# Patient Record
Sex: MALE | Race: WHITE | Employment: FULL TIME | URBAN - NONMETROPOLITAN AREA
[De-identification: names, ages, dates, MRNs, and addresses within clinical notes are randomized per-mention and may not be internally consistent; named-entity substitution may affect disease eponyms.]

---

## 2018-01-02 ENCOUNTER — HOSPITAL ENCOUNTER (OUTPATIENT)
Dept: MRI IMAGING | Age: 58
Discharge: HOME | End: 2018-01-02
Payer: COMMERCIAL

## 2018-01-02 DIAGNOSIS — M50.90 CERVICAL DISC DISEASE: ICD-10-CM

## 2018-01-02 PROCEDURE — 72141 MRI NECK SPINE W/O DYE: CPT

## 2020-08-18 ENCOUNTER — HOSPITAL ENCOUNTER (OUTPATIENT)
Dept: AUDIOLOGY | Age: 60
Discharge: HOME | End: 2020-08-18

## 2020-08-18 PROCEDURE — 92557 COMPREHENSIVE HEARING TEST: CPT | Performed by: AUDIOLOGIST

## 2020-08-18 NOTE — PROGRESS NOTES
AUDIOLOGICAL EVALUATION      REASON FOR TESTING:  Bilateral tinnitus and hearing loss. The patient denies any otalgia and aural pressure. He explains that the tinnitus has been present for approximately 7-8 years. He experiences episodic vertigo and treats himself with the Epley maneuver. He denies any history of occupational noise exposure. OTOSCOPY: WNL for both ears. AUDIOGRAM        Reliability: Good  Audiometer Used:  GSI-61    COMMENTS: Normal hearing sensitivity, sloping to severe sensorineural hearing loss for both ears. An asymmetry is present at 1500Hz-3KHz, with the left ear being worse. Speech discrimination ability is excellent at 92%, bilaterally. RECOMMENDATION(S):   1- ENT consult due to asymmetrical sensorineural hearing loss and tinnitus. Mr. Miguel Causey was encouraged to discuss this referral with his primary care provider. 2- Further testing, such as imaging of the internal auditory canals, is recommended to rule out a retrocochlear lesion in the asymmetrical sensorineural hearing loss. 3- Upon medical clearance, binaural amplification is recommended, especially for possible tinnitus masking benefits. He can be scheduled for a hearing aid evaluation appointment with [facility] following medical clearance. 4- Annual audiometry for monitoring purposes or sooner if any changes are noted in hearing ability.

## 2020-08-21 ENCOUNTER — TELEPHONE (OUTPATIENT)
Dept: ENT CLINIC | Age: 60
End: 2020-08-21

## 2020-08-21 ENCOUNTER — TELEPHONE (OUTPATIENT)
Dept: AUDIOLOGY | Age: 60
End: 2020-08-21

## 2020-08-21 NOTE — TELEPHONE ENCOUNTER
Called patient's insurance [employer] to determine if he has coverage for hearing aids. Hearing aids are excluded from his plan. He will need to self pay for amplification. Patient has appt with Avel Begun in ENT office 9/1/20 to review audiogram/asymmetrical SNHL.

## 2020-08-21 NOTE — TELEPHONE ENCOUNTER
Patient was in the office wanting to be seen by [facility]; does this patient need to be seen. Please advise.

## 2020-09-01 ENCOUNTER — OFFICE VISIT (OUTPATIENT)
Dept: ENT CLINIC | Age: 60
End: 2020-09-01
Payer: COMMERCIAL

## 2020-09-01 VITALS
SYSTOLIC BLOOD PRESSURE: 120 MMHG | DIASTOLIC BLOOD PRESSURE: 78 MMHG | HEIGHT: 71 IN | TEMPERATURE: 98.2 F | RESPIRATION RATE: 12 BRPM | BODY MASS INDEX: 28.22 KG/M2 | WEIGHT: 201.6 LBS | HEART RATE: 74 BPM

## 2020-09-01 PROCEDURE — 99203 OFFICE O/P NEW LOW 30 MIN: CPT | Performed by: PHYSICIAN ASSISTANT

## 2020-09-01 ASSESSMENT — ENCOUNTER SYMPTOMS
SHORTNESS OF BREATH: 0
APNEA: 0
FACIAL SWELLING: 0
VOICE CHANGE: 0
COLOR CHANGE: 0
SINUS PRESSURE: 0
CHEST TIGHTNESS: 0
RHINORRHEA: 0
ABDOMINAL PAIN: 0
WHEEZING: 0
STRIDOR: 0
DIARRHEA: 0
NAUSEA: 0
VOMITING: 0
SORE THROAT: 0
CHOKING: 0
TROUBLE SWALLOWING: 0
COUGH: 0

## 2020-09-01 NOTE — PROGRESS NOTES
[address] [facility]SE [facility]  Juan [address] [place] [address]  Dept: [phone]  Dept Fax: [phone]  Loc: [phone]    Barney Tomas is a 62 y.o. male who was referred by No ref. provider found for:  Chief Complaint   Patient presents with    New Patient     New patient is here for abnormal audio. Renata Marie HPI:     The patient presents for audiogram results. The patient reports that he has had bilateral tinnitus for at least 7 to 8 years. He reports recently started noticed some issues with hearing sound that certain pitches and discussed with his PCP. His PCP schedule him for an audiogram.  The patient reports that he feels like his hearing is equal bilaterally. Reports that tinnitus is constant. He states that he had an audiogram when the tinnitus started years ago and was told that there was nothing they could do for him. The patient was previously diagnosed with BPPV and has had the Epley maneuver approximately 3 times in his life. He reports that he can sense a flareup of his vertigo happening and with stretching his neck the sensation goes away. He reports his last episode of true vertigo was approximately 2 years ago and was reportedly evaluated because he was concerned he was having a stroke. He reports that his hearing and tinnitus has remained stable during previous dizzy spells. He denies a history of recurrent ear infections and tubes. He also denies a history of loud noise exposure. He denies any other concerns at this time. Subjective:        Review of Systems   Constitutional: Negative for activity change, appetite change, chills, diaphoresis, fatigue, fever and unexpected weight change. HENT: Positive for hearing loss and tinnitus.  Negative for congestion, dental problem, ear discharge, ear pain, facial swelling, mouth sores, nosebleeds, postnasal drip, rhinorrhea, sinus pressure, sneezing, sore throat, trouble swallowing and voice change. Eyes: Negative for visual disturbance. Respiratory: Negative for apnea, cough, choking, chest tightness, shortness of breath, wheezing and stridor. Cardiovascular: Negative for chest pain, palpitations and leg swelling. Gastrointestinal: Negative for abdominal pain, diarrhea, nausea and vomiting. Endocrine: Negative for cold intolerance, heat intolerance, polydipsia and polyuria. Genitourinary: Negative for difficulty urinating, discharge, dysuria, enuresis, hematuria, penile pain, penile swelling, scrotal swelling, testicular pain and urgency. Musculoskeletal: Negative for arthralgias, gait problem, neck pain and neck stiffness. Skin: Negative for color change, rash and wound. Allergic/Immunologic: Negative for environmental allergies, food allergies and immunocompromised state. Neurological: Negative for dizziness, seizures, syncope, facial asymmetry, speech difficulty, light-headedness and headaches. Hematological: Negative for adenopathy. Does not bruise/bleed easily. Psychiatric/Behavioral: Negative for confusion, sleep disturbance and suicidal ideas. The patient is not nervous/anxious. Past Medical History:  History reviewed. No pertinent past medical history. Social History:    TOBACCO:   reports that he has never smoked. He has never used smokeless tobacco.  ETOH:   reports current alcohol use. DRUGS:   has no history on file for drug. Family History:       Problem Relation Age of Onset    Heart Disease Mother     High Blood Pressure Father        Surgical History:  Past Surgical History:   Procedure Laterality Date    ANTERIOR CRUCIATE LIGAMENT REPAIR          Objective: This is a 62 y.o. male. Patient is alert and oriented to person, place and time. Patient appears well developed, well nourished. Mood is happy with normal affect. Not obviously hearing impaired. No abnormality in speech noted.     /78 (Site: Left Upper Arm, Position: Sitting)   Pulse 74   Temp 98.2 °F (36.8 °C) (Infrared)   Resp 12   Ht 5' 11\" (1.803 m)   Wt 201 lb 9.6 oz (91.4 kg)   BMI 28.12 kg/m²     Head:   Normocephalic, atraumatic. No obvious masses or lesions noted. Ears:  External ears: Normal: no scars, lesions or masses. Mastoid process: No erythema noted. No tenderness to palpation. R External auditory canal: clear and free of any pathology  L External auditory canal: clear and free of any pathology   Tympanic membranes:  R intact, translucent                                                  L intact, translucent  Nose:    External nose: Appears midline. No obvious deformity or masses. Septum:  deviated. No septal hematoma. No perforation. Mucosa:  clear  Turbinates: pink and congested            Discharge:  none    Mouth/Throat:  Lips, tongue and oral cavity: Normal. No masses or lesions noted   Dentition: good, no malocclusion  Oral mucosa: moist  Tonsils: present, cryptic, but not acutely enlarged and no erythema or exudates  Oropharynx: normal-appearing mucosa  Hard and soft palates: symmetrical and intact. Salivary glands: not enlarged and no tenderness to palpation. Uvula: midline, no obvious lesions   Gag reflex is present. Neck: Trachea midline. Thyroid not enlarged, no palpable masses or tenderness. Lymphatic: No cervical lymphadenopathy noted. Eyes: RADHA, EOM intact. Conjunctiva moist without discharge. Lungs: Normal effort of breathing, not obviously distressed. Neuro: Cranial nerves II-XII grossly intact. Extremities: No clubbing, edema, or cyanosis noted. Data:    Other diagnostic test:  AUDIOGRAM    Reliability: Good    Assessment/Plan:     Diagnosis Orders   1. Asymmetrical hearing loss of both ears  MRI BRAIN W WO CONTRAST   2. Sensorineural hearing loss (SNHL) of both ears  MRI BRAIN W WO CONTRAST   3. Tinnitus of both ears     4.  Dizziness         The patient is a 62 y.o. male that presents for evaluation of

## 2020-09-10 ENCOUNTER — HOSPITAL ENCOUNTER (OUTPATIENT)
Dept: MRI IMAGING | Age: 60
Discharge: HOME | End: 2020-09-10
Payer: COMMERCIAL

## 2020-09-10 PROCEDURE — 6360000004 HC RX CONTRAST MEDICATION: Performed by: PHYSICIAN ASSISTANT

## 2020-09-10 PROCEDURE — 70553 MRI BRAIN STEM W/O & W/DYE: CPT

## 2020-09-10 PROCEDURE — A9579 GAD-BASE MR CONTRAST NOS,1ML: HCPCS | Performed by: PHYSICIAN ASSISTANT

## 2020-09-10 RX ADMIN — GADOTERIDOL 15 ML: 279.3 INJECTION, SOLUTION INTRAVENOUS at 18:53

## 2020-09-14 ENCOUNTER — TELEPHONE (OUTPATIENT)
Dept: ENT CLINIC | Age: 60
End: 2020-09-14

## 2020-09-14 NOTE — TELEPHONE ENCOUNTER
I attempted to call the patient to discuss his MRI results. There was no answer, so I left a voicemail requesting he call the office back. He called back shortly after I left the voicemail. I informed him that the MRI showed no source for his asymmetrical hearing loss. He would like to proceed with hearing aid evaluation. Order for evaluation placed. He denies any other questions or concerns at this time. He will contact the office if anything changes.

## 2020-10-14 ENCOUNTER — HOSPITAL ENCOUNTER (OUTPATIENT)
Dept: AUDIOLOGY | Age: 60
Discharge: HOME | End: 2020-10-14

## 2020-10-14 PROCEDURE — 9990000010 HC NO CHARGE VISIT: Performed by: AUDIOLOGIST

## 2020-10-14 NOTE — PROGRESS NOTES
HEARING AID EVALUATION: Discussed hearing aid options with the patient. Reviewed styles and technology. Patient would like to try CICs. Decided on Tapan Philip. After patient left HAE, it was determined that the only option for wireless connectivity would be with the use an accessory like the surflink mini mobile. If the patient would like one after the two week check, I will order it. The cost is $220.00 for the patient. Patient's hearing aid fitting is scheduled for 11/4/2020.

## 2020-11-04 ENCOUNTER — HOSPITAL ENCOUNTER (OUTPATIENT)
Dept: AUDIOLOGY | Age: 60
Discharge: HOME | End: 2020-11-04

## 2020-11-04 PROCEDURE — V5258 HEARING AID, DIGIT, BIN, CIC: HCPCS | Performed by: AUDIOLOGIST

## 2020-11-04 PROCEDURE — V5160 DISPENSING FEE BINAURAL: HCPCS | Performed by: AUDIOLOGIST

## 2020-11-05 ENCOUNTER — HOSPITAL ENCOUNTER (OUTPATIENT)
Dept: AUDIOLOGY | Age: 60
Discharge: HOME | End: 2020-11-05

## 2020-11-05 PROCEDURE — 9990000010 HC NO CHARGE VISIT: Performed by: AUDIOLOGIST

## 2020-11-05 NOTE — PROGRESS NOTES
ACCOUNT #: [de-identified]    DIAGNOSIS: Sensorineural hearing loss of both ears. HEARING AID PROBLEM: The patient states that the hearing aid masking noise is louder than conversation. Deactivated the tinnitus masker, changed to DSL and increased 6KHz-8KHz on both hearing aids. If he wishes to access different programs, then we can have a push button added to the hearing aids or the patient may consider [employer] mobile device in order to have connection with his cell phone via bluetooth. He was offered the option to try YOVANI hearing aids for a better fitting/possible improved high frequency output and bluetooth connection. He really prefers to stick with CIC hearing aids. Gave a magnet to him to add to his office phone to see if it will help switch the [employer] [place] program on his hearing aids. Will see patient as scheduled for two week check.

## 2020-11-18 ENCOUNTER — HOSPITAL ENCOUNTER (OUTPATIENT)
Dept: AUDIOLOGY | Age: 60
Discharge: HOME | End: 2020-11-18

## 2020-11-18 PROCEDURE — 9990000010 HC NO CHARGE VISIT: Performed by: AUDIOLOGIST

## 2020-11-18 NOTE — PROGRESS NOTES
HEARING AID CHECK: The patient is not doing well with the new hearing aids (Gisele Chio CICs). He had feedback and could not hear well with them. He is willing to try YOVANI hearing aids now. Programmed [employer]elle[employer] M90 RICs to trial (s/n [id number] and [id number]). Tried the tinnitus balance, but the patient did not benefit. Paired the hearing aids with the patient's cell phone- instructed on use. Used #1 receivers and large open domes. Will call the patient next week (by 11/23) to see if he wants to continue to trial these or if he would like for me to order RICs for him.

## 2020-11-24 ENCOUNTER — TELEPHONE (OUTPATIENT)
Dept: AUDIOLOGY | Age: 60
End: 2020-11-24

## 2020-11-25 NOTE — TELEPHONE ENCOUNTER
Patient returned my call. He would like for me to order the [employer] YOVANI hearing aids. He will look at the colors online and call Monday or email me with his selection. Returned the [employer] for credit today- probably won't ship out until 11/30 due to Thanksgiving holiday.

## 2020-12-09 ENCOUNTER — HOSPITAL ENCOUNTER (OUTPATIENT)
Dept: AUDIOLOGY | Age: 60
Discharge: HOME | End: 2020-12-09

## 2020-12-09 PROCEDURE — 9990000010 HC NO CHARGE VISIT: Performed by: AUDIOLOGIST

## 2022-11-10 ENCOUNTER — HOSPITAL ENCOUNTER (OUTPATIENT)
Dept: CARDIAC REHAB | Age: 62
Setting detail: THERAPIES SERIES
Discharge: HOME | End: 2022-11-10